# Patient Record
Sex: FEMALE | Race: WHITE | NOT HISPANIC OR LATINO | Employment: OTHER | URBAN - METROPOLITAN AREA
[De-identification: names, ages, dates, MRNs, and addresses within clinical notes are randomized per-mention and may not be internally consistent; named-entity substitution may affect disease eponyms.]

---

## 2017-02-01 ENCOUNTER — APPOINTMENT (OUTPATIENT)
Dept: LAB | Facility: CLINIC | Age: 82
End: 2017-02-01
Payer: MEDICARE

## 2017-02-01 ENCOUNTER — TRANSCRIBE ORDERS (OUTPATIENT)
Dept: LAB | Facility: CLINIC | Age: 82
End: 2017-02-01

## 2017-02-01 DIAGNOSIS — I10 ESSENTIAL HYPERTENSION, BENIGN: Primary | ICD-10-CM

## 2017-02-01 DIAGNOSIS — E78.5 HYPERLIPIDEMIA, UNSPECIFIED HYPERLIPIDEMIA TYPE: ICD-10-CM

## 2017-02-01 LAB
ALBUMIN SERPL BCP-MCNC: 4 G/DL (ref 3.5–5)
ALP SERPL-CCNC: 112 U/L (ref 46–116)
ALT SERPL W P-5'-P-CCNC: 28 U/L (ref 12–78)
ANION GAP SERPL CALCULATED.3IONS-SCNC: 6 MMOL/L (ref 4–13)
AST SERPL W P-5'-P-CCNC: 20 U/L (ref 5–45)
BASOPHILS # BLD AUTO: 0.03 THOUSANDS/ΜL (ref 0–0.1)
BASOPHILS NFR BLD AUTO: 1 % (ref 0–1)
BILIRUB SERPL-MCNC: 0.94 MG/DL (ref 0.2–1)
BUN SERPL-MCNC: 20 MG/DL (ref 5–25)
CALCIUM SERPL-MCNC: 9.8 MG/DL (ref 8.3–10.1)
CHLORIDE SERPL-SCNC: 105 MMOL/L (ref 100–108)
CHOLEST SERPL-MCNC: 191 MG/DL (ref 50–200)
CO2 SERPL-SCNC: 29 MMOL/L (ref 21–32)
CREAT SERPL-MCNC: 1.01 MG/DL (ref 0.6–1.3)
EOSINOPHIL # BLD AUTO: 0.1 THOUSAND/ΜL (ref 0–0.61)
EOSINOPHIL NFR BLD AUTO: 2 % (ref 0–6)
ERYTHROCYTE [DISTWIDTH] IN BLOOD BY AUTOMATED COUNT: 13.2 % (ref 11.6–15.1)
GFR SERPL CREATININE-BSD FRML MDRD: 52.6 ML/MIN/1.73SQ M
GLUCOSE SERPL-MCNC: 85 MG/DL (ref 65–140)
HCT VFR BLD AUTO: 44.2 % (ref 34.8–46.1)
HDLC SERPL-MCNC: 97 MG/DL (ref 40–60)
HGB BLD-MCNC: 15 G/DL (ref 11.5–15.4)
LDLC SERPL CALC-MCNC: 78 MG/DL (ref 0–100)
LYMPHOCYTES # BLD AUTO: 2.31 THOUSANDS/ΜL (ref 0.6–4.47)
LYMPHOCYTES NFR BLD AUTO: 41 % (ref 14–44)
MCH RBC QN AUTO: 32.5 PG (ref 26.8–34.3)
MCHC RBC AUTO-ENTMCNC: 33.9 G/DL (ref 31.4–37.4)
MCV RBC AUTO: 96 FL (ref 82–98)
MONOCYTES # BLD AUTO: 0.55 THOUSAND/ΜL (ref 0.17–1.22)
MONOCYTES NFR BLD AUTO: 10 % (ref 4–12)
NEUTROPHILS # BLD AUTO: 2.63 THOUSANDS/ΜL (ref 1.85–7.62)
NEUTS SEG NFR BLD AUTO: 46 % (ref 43–75)
NRBC BLD AUTO-RTO: 0 /100 WBCS
PLATELET # BLD AUTO: 186 THOUSANDS/UL (ref 149–390)
PMV BLD AUTO: 11.1 FL (ref 8.9–12.7)
POTASSIUM SERPL-SCNC: 4.1 MMOL/L (ref 3.5–5.3)
PROT SERPL-MCNC: 7.1 G/DL (ref 6.4–8.2)
RBC # BLD AUTO: 4.62 MILLION/UL (ref 3.81–5.12)
SODIUM SERPL-SCNC: 140 MMOL/L (ref 136–145)
TRIGL SERPL-MCNC: 81 MG/DL
WBC # BLD AUTO: 5.63 THOUSAND/UL (ref 4.31–10.16)

## 2017-02-01 PROCEDURE — 36415 COLL VENOUS BLD VENIPUNCTURE: CPT

## 2017-02-01 PROCEDURE — 85025 COMPLETE CBC W/AUTO DIFF WBC: CPT

## 2017-02-01 PROCEDURE — 80053 COMPREHEN METABOLIC PANEL: CPT

## 2017-02-01 PROCEDURE — 80061 LIPID PANEL: CPT

## 2017-04-19 ENCOUNTER — ALLSCRIPTS OFFICE VISIT (OUTPATIENT)
Dept: OTHER | Facility: OTHER | Age: 82
End: 2017-04-19

## 2017-07-12 ENCOUNTER — ALLSCRIPTS OFFICE VISIT (OUTPATIENT)
Dept: OTHER | Facility: OTHER | Age: 82
End: 2017-07-12

## 2017-10-11 ENCOUNTER — ALLSCRIPTS OFFICE VISIT (OUTPATIENT)
Dept: OTHER | Facility: OTHER | Age: 82
End: 2017-10-11

## 2017-10-12 NOTE — PROGRESS NOTES
Assessment  1  Atherosclerotic peripheral vascular disease (440 20) (I70 209)   2  Onychomycosis (110 1) (B35 1)   3  Pain in both feet (729 5) (M79 671,M79 672)    Plan   · Follow-up visit in 9 weeks Evaluation and Treatment  Follow-up  Status: Hold For -  Scheduling  Requested for: [id number]   · Inspect your feet and legs daily if you have vascular disease ; Status:Complete;   Done:  [id number] 09:33AM    Discussion/Summary    Continue stretching and icing to prevent Achilles tendinitis recurrence  Daily foot checks monitor for signs of infection  The patient was counseled regarding instructions for management,-patient and family education,-importance of compliance with treatment  Chief Complaint  patient has chief complaint of thick painful toenails that hurt when walking and wearing shoes  History of Present Illness  HPI: Pain in Achilles has mostly resolved only pain when walking long distances  Active Problems  1  Achilles tendinitis of right lower extremity (726 71) (M76 61)   2  Atherosclerotic peripheral vascular disease (440 20) (I70 209)   3  Onychomycosis (110 1) (B35 1)   4  Pain in both feet (729 5) (M79 671,M79 672)    Current Meds   1  Albuterol Sulfate  MCG/ACT AERS; Therapy: (Recorded:19Apr2017) to Recorded   2  Atorvastatin Calcium TABS; Therapy: (Recorded:19Apr2017) to Recorded   3  Daily Multivitamin TABS; Therapy: (Recorded:19Apr2017) to Recorded   4  Klor-Con M20 20 MEQ Oral Tablet Extended Release; Therapy: (Recorded:19Apr2017) to Recorded   5  Triamterene-HCTZ 37 5-25 MG Oral Capsule; Therapy: (Recorded:[id number]) to Recorded    The medication list was reviewed and updated today  Allergies  1   No Known Drug Allergies    Vitals   Recorded: [id number] 99:66RA   Systolic 029   Diastolic 85   Height 5 ft 3 5 in   Weight 200 lb    BMI Calculated 34 87   BSA Calculated 1 94     Physical Exam  Left Foot: Appearance: Normal except as noted: excessive pronation-and-pes planus  Second toe deformities include hammer toe  Third toe deformities include hammer toe  Forth toe deformities include hammer toe  Fifth toe deformities include hammer toe  Evaluation of the great toe nail demonstrates an ingrown nail  Mild ingrown tibial border  All nails thick discolored dystrophic, positive subungual debris, positive pain on palpation  Special Tests: Skin is hairless and atrophic vascular changes noted bilateral Q9 findings bilateral    Right Foot: Appearance: Normal except as noted: excessive pronation-and-pes planus  Second toe deformities include hammer toe  Third toe deformities include hammer toe  Forth toe deformities include hammer toe  Fifth toe deformities include hammer toe  Negative ulceration negative maceration negative signs of infection  Special Tests: mild edema positive nonpitting edema bilateral feet and ankles moderate venous stasis  Neurological Exam: performed  Light touch was intact bilaterally  Vibratory sensation was decreased in both first metatarsophalangeal joints  Response to monofilament test was intact bilaterally  Vascular Exam: performed Dorsalis pedis pulses were 1/4 bilaterally  Posterior tibial pulses were absent bilaterally  Capillary refill time was greater than 3 seconds bilaterally  Toenails: All of the toenails were elongated,-hypertrophied,-discolored,-shown to have subungual debris-and-tender  Both first toenails were ingrown  Hyperkeratosis: present on both first sub metatarsals  Procedure  Aseptic debridement and planing of nails ×10, with nail nipper and nail , no complications      Signatures   Electronically signed by :  Augie Castillo DPM; Oct 11 2017  9:34AM EST                       (Author)

## 2017-12-13 ENCOUNTER — ALLSCRIPTS OFFICE VISIT (OUTPATIENT)
Dept: OTHER | Facility: OTHER | Age: 82
End: 2017-12-13

## 2017-12-20 NOTE — PROGRESS NOTES
Assessment  1  Onychomycosis (110 1) (B35 1)   2  Atherosclerotic peripheral vascular disease (440 20) (I70 209)   3  Pain in both feet (729 5) (M79 671,M79 672)    Plan   · Inspect your feet and legs daily if you have vascular disease ; Status:Complete;   Done:34Ioh8640   · Follow-up visit in 9 weeks Evaluation and Treatment  Follow-up  Status: Complete  Done:21Iuh1473    Discussion/Summary    Daily foot checks monitor for signs of infection  The patient was counseled regarding instructions for management,-- patient and family education,-- importance of compliance with treatment  Chief Complaint  patient has chief complaint of thick painful toenails that hurt when walking and wearing shoes  History of Present Illness  HPI: Pain in Achilles has mostly resolved only pain when walking long distances  Active Problems  1  Achilles tendinitis of right lower extremity (726 71) (M76 61)   2  Atherosclerotic peripheral vascular disease (440 20) (I70 209)   3  Onychomycosis (110 1) (B35 1)   4  Pain in both feet (729 5) (M79 671,M79 672)    Current Meds   1  Albuterol Sulfate  MCG/ACT AERS; Therapy: (Recorded:19Apr2017) to Recorded   2  Atorvastatin Calcium TABS; Therapy: (Recorded:19Apr2017) to Recorded   3  Daily Multivitamin TABS; Therapy: (Recorded:19Apr2017) to Recorded   4  Klor-Con M20 20 MEQ Oral Tablet Extended Release; Therapy: (Recorded:19Apr2017) to Recorded   5  Triamterene-HCTZ 37 5-25 MG Oral Capsule; Therapy: (Recorded:19Apr2017) to Recorded    The medication list was reviewed and updated today  Allergies  1  No Known Drug Allergies    Vitals   Recorded: [id number] 09:29AM   Height 5 ft 3 5 in   Weight 200 lb    BMI Calculated 34 87   BSA Calculated 1 94     Physical Exam   Constitutional: no acute distress, well appearing and well nourished  Cardiovascular: abnormal dorsalis pedis pulse,-- abnormal posterior tibialis pulse,-- dependence rubor-- and-- abnormal capillary refill  Orthopedic/Biomechanical: abnormal foot type,-- hammertoe(s),-- discolored nails-- and-- nail tenderness  Skin: keratoses  Psychiatric: oriented to person, place, and time  Left Foot: Appearance: Normal except as noted: excessive pronation-- and-- pes planus  Second toe deformities include hammer toe  Third toe deformities include hammer toe  Forth toe deformities include hammer toe  Fifth toe deformities include hammer toe  Evaluation of the great toe nail demonstrates no ingrown nail  Resolving ingrown no signs of infection  All nails thick discolored dystrophic, positive subungual debris, positive pain on palpation  Special Tests: Skin is hairless and atrophic vascular changes noted bilateral Q9 findings bilateral    Right Foot: Appearance: Normal except as noted: excessive pronation-- and-- pes planus  Second toe deformities include hammer toe  Third toe deformities include hammer toe  Forth toe deformities include hammer toe  Fifth toe deformities include hammer toe  Negative ulceration negative maceration negative signs of infection  Special Tests: moderate xerosis plantar feet bilateral  No signs of infection  Fissure posterior heel right  Granular superficial    Neurological Exam: performed  Light touch was intact bilaterally  Vibratory sensation was decreased in both first metatarsophalangeal joints  Response to monofilament test was intact bilaterally  Vascular Exam: performed Dorsalis pedis pulses were 1/4 bilaterally  Posterior tibial pulses were absent bilaterally  Capillary refill time was greater than 3 seconds bilaterally  Toenails: All of the toenails were elongated,-- hypertrophied,-- discolored,-- shown to have subungual debris-- and-- tender  Both first toenails were ingrown  Hyperkeratosis: present on both first sub metatarsals        Procedure  Aseptic debridement and planing of nails Ã10, with nail nipper and nail , no complications      Future Appointments    Date/Time Provider Specialty Site   03/14/2018 09:30 AM [facility] [address] [facility]     Signatures   Electronically signed by :  Nick Escobar DPM; Dec 19 2017 10:43AM EST                       (Author)

## 2018-01-12 VITALS
DIASTOLIC BLOOD PRESSURE: 85 MMHG | BODY MASS INDEX: 34.15 KG/M2 | WEIGHT: 200 LBS | SYSTOLIC BLOOD PRESSURE: 140 MMHG | HEIGHT: 64 IN

## 2018-01-13 VITALS — WEIGHT: 200 LBS | HEIGHT: 64 IN | BODY MASS INDEX: 34.15 KG/M2

## 2018-01-14 VITALS
BODY MASS INDEX: 34.15 KG/M2 | SYSTOLIC BLOOD PRESSURE: 141 MMHG | DIASTOLIC BLOOD PRESSURE: 86 MMHG | WEIGHT: 200 LBS | HEIGHT: 64 IN

## 2018-01-22 VITALS — HEIGHT: 64 IN | BODY MASS INDEX: 34.15 KG/M2 | WEIGHT: 200 LBS

## 2018-03-14 ENCOUNTER — OFFICE VISIT (OUTPATIENT)
Dept: PODIATRY | Facility: CLINIC | Age: 83
End: 2018-03-14
Payer: MEDICARE

## 2018-03-14 DIAGNOSIS — M79.672 PAIN IN BOTH FEET: ICD-10-CM

## 2018-03-14 DIAGNOSIS — I70.209 ATHEROSCLEROTIC PERIPHERAL VASCULAR DISEASE (HCC): Primary | ICD-10-CM

## 2018-03-14 DIAGNOSIS — M79.671 PAIN IN BOTH FEET: ICD-10-CM

## 2018-03-14 DIAGNOSIS — B35.1 ONYCHOMYCOSIS: ICD-10-CM

## 2018-03-14 PROCEDURE — 11721 DEBRIDE NAIL 6 OR MORE: CPT | Performed by: PODIATRIST

## 2018-03-14 RX ORDER — ATORVASTATIN CALCIUM 80 MG/1
TABLET, FILM COATED ORAL
COMMUNITY
End: 2019-02-11

## 2018-03-14 RX ORDER — TRIAMTERENE AND HYDROCHLOROTHIAZIDE 37.5; 25 MG/1; MG/1
TABLET ORAL
COMMUNITY
Start: 2018-02-26

## 2018-03-14 RX ORDER — BUDESONIDE AND FORMOTEROL FUMARATE DIHYDRATE 80; 4.5 UG/1; UG/1
AEROSOL RESPIRATORY (INHALATION)
COMMUNITY
Start: 2017-02-01 | End: 2019-02-11

## 2018-03-14 RX ORDER — ALBUTEROL SULFATE 90 UG/1
2 AEROSOL, METERED RESPIRATORY (INHALATION)
COMMUNITY
Start: 2017-04-07 | End: 2018-04-07

## 2018-03-14 RX ORDER — POTASSIUM CHLORIDE 1500 MG/1
TABLET, EXTENDED RELEASE ORAL
COMMUNITY
Start: 2017-12-15

## 2018-03-14 RX ORDER — ATORVASTATIN CALCIUM 10 MG/1
TABLET, FILM COATED ORAL
COMMUNITY
Start: 2017-11-20 | End: 2019-02-11

## 2018-03-14 RX ORDER — ATORVASTATIN CALCIUM 10 MG/1
TABLET, FILM COATED ORAL
COMMUNITY
Start: 2018-02-18

## 2018-03-14 NOTE — PROGRESS NOTES
Assessment/Plan:    Aseptic debridement and planning of nails x10 and manually and mechanically  Daily foot checks monitor for signs of infection follow-up 3 months     Diagnoses and all orders for this visit:    Atherosclerotic peripheral vascular disease ([address] )    Onychomycosis    Pain in both feet    Other orders  -     albuterol (PROVENTIL HFA,VENTOLIN HFA) 90 mcg/act inhaler; Inhale 2 puffs  -     atorvastatin (LIPITOR) 10 mg tablet; TAKE 1 TABLET DAILY  -     atorvastatin (LIPITOR) 10 mg tablet;   -     atorvastatin (LIPITOR) 80 mg tablet; Take by mouth  -     budesonide-formoterol (SYMBICORT) 80-4 5 MCG/ACT inhaler; SYMBICORT 80-4 5 MCG/ACT AERO  -     Multiple Vitamin (MULTI-VITAMIN DAILY PO); Take by mouth  -     KLOR-CON M20 20 MEQ tablet;   -     triamterene-hydrochlorothiazide (MAXZIDE-25) 37 5-25 mg per tablet;           Subjective:      Patient ID: Litzy Palmer is a 80 y o  female  Patient has chief complaint of thick painful nails that hurt when walking wearing shoes patient has not noticed any redness or signs of infection  The following portions of the patient's history were reviewed and updated as appropriate: allergies, current medications, past family history, past medical history, past social history, past surgical history and problem list     Review of Systems   Constitutional: Negative  HENT: Negative  Eyes: Negative  Respiratory: Negative  Cardiovascular: Negative  Gastrointestinal: Negative  Endocrine: Negative  Genitourinary: Negative  Musculoskeletal: Negative  Skin: Negative  Allergic/Immunologic: Negative  Neurological: Negative  Hematological: Negative  Psychiatric/Behavioral: Negative  Objective: There were no vitals taken for this visit  Current Meds   1  Albuterol Sulfate  MCG/ACT AERS; Therapy: (Recorded:19Apr2017) to Recorded   2  Atorvastatin Calcium TABS;  Therapy: (Recorded:19Apr2017) to Recorded 3  Daily Multivitamin TABS; Therapy: (Recorded:19Apr2017) to Recorded   4  Klor-Con M20 20 MEQ Oral Tablet Extended Release; Therapy: (Recorded:19Apr2017) to Recorded   5  Triamterene-HCTZ 37 5-25 MG Oral Capsule; Therapy: (Recorded:19Apr2017) to Recorded            Allergies  1  No Known Drug Allergies     Physical Exam     Constitutional: no acute distress, well appearing and well nourished  Cardiovascular: abnormal dorsalis pedis pulse,-- abnormal posterior tibialis pulse,-- dependence rubor-- and-- abnormal capillary refill  Orthopedic/Biomechanical: abnormal foot type,-- hammertoe(s),-- discolored nails-- and-- nail tenderness  Skin: keratoses  Psychiatric: oriented to person, place, and time  Left Foot: Appearance: Normal except as noted: excessive pronation-- and-- pes planus  Second toe deformities include hammer toe  Third toe deformities include hammer toe  Forth toe deformities include hammer toe  Fifth toe deformities include hammer toe  Evaluation of the great toe nail demonstrates no ingrown nail  Resolving ingrown no signs of infection  All nails thick discolored dystrophic, positive subungual debris, positive pain on palpation  Special Tests: Skin is hairless and atrophic vascular changes noted bilateral Q9 findings bilateral    Right Foot: Appearance: Normal except as noted: excessive pronation-- and-- pes planus  Second toe deformities include hammer toe  Third toe deformities include hammer toe  Forth toe deformities include hammer toe  Fifth toe deformities include hammer toe  Negative ulceration negative maceration negative signs of infection  Special Tests: moderate xerosis plantar feet bilateral  No signs of infection  Fissure posterior heel right  Granular superficial    Neurological Exam: performed  Light touch was intact bilaterally  Vibratory sensation was decreased in both first metatarsophalangeal joints  Response to monofilament test was intact bilaterally     Vascular Exam: performed Dorsalis pedis pulses were 1/4 bilaterally  Posterior tibial pulses were absent bilaterally  Capillary refill time was greater than 3 seconds bilaterally  Toenails: All of the toenails were elongated,-- hypertrophied,-- discolored,-- shown to have subungual debris-- and-- tender  Both first toenails were ingrown  Hyperkeratosis: present on both first sub metatarsals  Plus one edema bilateral moderate venous stasis negative calf tenderness no signs of infection  Skin is hairless and atrophic vascular changes noted bilateral  Decreased vibratory sensation bilateral monofilament sensation 2/10 absent

## 2018-06-13 ENCOUNTER — OFFICE VISIT (OUTPATIENT)
Dept: PODIATRY | Facility: CLINIC | Age: 83
End: 2018-06-13
Payer: MEDICARE

## 2018-06-13 VITALS — BODY MASS INDEX: 36.65 KG/M2 | HEIGHT: 65 IN | WEIGHT: 220 LBS

## 2018-06-13 DIAGNOSIS — I87.2 VENOUS (PERIPHERAL) INSUFFICIENCY: ICD-10-CM

## 2018-06-13 DIAGNOSIS — I70.209 ATHEROSCLEROTIC PERIPHERAL VASCULAR DISEASE (HCC): Primary | ICD-10-CM

## 2018-06-13 DIAGNOSIS — M79.671 PAIN IN BOTH FEET: ICD-10-CM

## 2018-06-13 DIAGNOSIS — M79.89 LEG SWELLING: ICD-10-CM

## 2018-06-13 DIAGNOSIS — B35.1 ONYCHOMYCOSIS: ICD-10-CM

## 2018-06-13 DIAGNOSIS — M79.672 PAIN IN BOTH FEET: ICD-10-CM

## 2018-06-13 PROCEDURE — 99212 OFFICE O/P EST SF 10 MIN: CPT | Performed by: PODIATRIST

## 2018-06-13 PROCEDURE — 11721 DEBRIDE NAIL 6 OR MORE: CPT | Performed by: PODIATRIST

## 2018-06-13 NOTE — PROGRESS NOTES
Assessment/Plan:    Aseptic debridement and planning of nails x10 and manually and mechanically discussed elevation, discussed pressure stockings  Discussed risks and signs of DVT  Suggested vascular evaluation  Stressed elavation      Daily foot checks monitor for signs of infection follow-up 3 months     Diagnoses and all orders for this visit:    Atherosclerotic peripheral vascular disease ([address] )    Onychomycosis    Pain in both feet    Venous (peripheral) insufficiency    Leg swelling          Subjective:      Patient ID: Susan Lacey is a 80 y o  female  Patient has chief complaint of thick painful nails that hurt when walking wearing shoes patient has not noticed any redness or signs of infection  Patient has moderate swelling bilateral legs  Has some discoloration of bilateral legs  Denies any calf tenderness or shortness of breath  No cramping in legs when walking  Patient denies any falls or instability  The following portions of the patient's history were reviewed and updated as appropriate: allergies, current medications, past family history, past medical history, past social history, past surgical history and problem list     Review of Systems   Constitutional: Negative  HENT: Negative  Eyes: Negative  Respiratory: Negative  Cardiovascular: Negative  Gastrointestinal: Negative  Endocrine: Negative  Genitourinary: Negative  Musculoskeletal: Negative  Skin: Negative  Allergic/Immunologic: Negative  Neurological: Negative  Hematological: Negative  Psychiatric/Behavioral: Negative  Objective: There were no vitals taken for this visit  Current Meds   1  Albuterol Sulfate  MCG/ACT AERS; Therapy: (Recorded:19Apr2017) to Recorded   2  Atorvastatin Calcium TABS; Therapy: (Recorded:19Apr2017) to Recorded   3  Daily Multivitamin TABS; Therapy: (Recorded:19Apr2017) to Recorded   4   Klor-Con M20 20 MEQ Oral Tablet Extended Release; Therapy: (Recorded:19Apr2017) to Recorded   5  Triamterene-HCTZ 37 5-25 MG Oral Capsule; Therapy: (Recorded:19Apr2017) to Recorded            Allergies  1  No Known Drug Allergies     Physical Exam     Constitutional: no acute distress, well appearing and well nourished  Cardiovascular: abnormal dorsalis pedis pulse,-- abnormal posterior tibialis pulse,-- dependence rubor-- and-- abnormal capillary refill  Orthopedic/Biomechanical: abnormal foot type,-- hammertoe(s),-- discolored nails-- and-- nail tenderness  Skin: keratoses  Psychiatric: oriented to person, place, and time  Left Foot: Appearance: Normal except as noted: excessive pronation-- and-- pes planus  Second toe deformities include hammer toe  Third toe deformities include hammer toe  Forth toe deformities include hammer toe  Fifth toe deformities include hammer toe  Evaluation of the great toe nail demonstrates no ingrown nail  Resolving ingrown no signs of infection  All nails thick discolored dystrophic, positive subungual debris, positive pain on palpation  Special Tests: Skin is hairless and atrophic vascular changes noted bilateral Q9 findings bilateral    Right Foot: Appearance: Normal except as noted: excessive pronation-- and-- pes planus  Second toe deformities include hammer toe  Third toe deformities include hammer toe  Forth toe deformities include hammer toe  Fifth toe deformities include hammer toe  Negative ulceration negative maceration negative signs of infection  Special Tests: moderate xerosis plantar feet bilateral  No signs of infection  Fissure posterior heel right  Granular superficial    Neurological Exam: performed  Light touch was intact bilaterally  Vibratory sensation was decreased in both first metatarsophalangeal joints  Response to monofilament test was intact bilaterally  Vascular Exam: performed Dorsalis pedis pulses were 1/4 bilaterally  Posterior tibial pulses were absent bilaterally   Capillary refill time was greater than 3 seconds bilaterally  Toenails: All of the toenails were elongated,-- hypertrophied,-- discolored,-- shown to have subungual debris-- and-- tender  Both first toenails were ingrown  Hyperkeratosis: present on both first sub metatarsals  Plus one edema bilateral moderate venous stasis negative calf tenderness no signs of infection  Skin is hairless and atrophic vascular changes noted bilateral  Decreased vibratory sensation bilateral monofilament sensation 2/10 absent  Plus one edema bilateral feet and ankles moderate venous stasis mild hyperpigmentation  Negative calf tenderness negative Homans sign    Patient has rectus gait and no instability on gait exam

## 2018-09-12 ENCOUNTER — OFFICE VISIT (OUTPATIENT)
Dept: PODIATRY | Facility: CLINIC | Age: 83
End: 2018-09-12
Payer: MEDICARE

## 2018-09-12 VITALS — HEIGHT: 65 IN | WEIGHT: 220 LBS | BODY MASS INDEX: 36.65 KG/M2

## 2018-09-12 DIAGNOSIS — I70.209 ATHEROSCLEROTIC PERIPHERAL VASCULAR DISEASE (HCC): Primary | ICD-10-CM

## 2018-09-12 DIAGNOSIS — M79.671 PAIN IN BOTH FEET: ICD-10-CM

## 2018-09-12 DIAGNOSIS — B35.1 ONYCHOMYCOSIS: ICD-10-CM

## 2018-09-12 DIAGNOSIS — M79.672 PAIN IN BOTH FEET: ICD-10-CM

## 2018-09-12 PROCEDURE — 11721 DEBRIDE NAIL 6 OR MORE: CPT | Performed by: PODIATRIST

## 2018-09-12 NOTE — PROGRESS NOTES
Assessment/Plan:    Aseptic debridement and planning of nails x10 and manually and mechanically     Daily foot checks monitor for signs of infection follow-up 3 months     Diagnoses and all orders for this visit:    Atherosclerotic peripheral vascular disease ([address] )    Onychomycosis    Pain in both feet          Subjective:      Patient ID: Merari Encarnacion is a 80 y o  female  Patient has chief complaint of thick painful nails that hurt when walking wearing shoes patient has not noticed any redness or signs of infection  The following portions of the patient's history were reviewed and updated as appropriate: allergies, current medications, past family history, past medical history, past social history, past surgical history and problem list     Review of Systems   Constitutional: Negative  HENT: Negative  Eyes: Negative  Respiratory: Negative  Cardiovascular: Negative  Gastrointestinal: Negative  Endocrine: Negative  Genitourinary: Negative  Musculoskeletal: Negative  Skin: Negative  Allergic/Immunologic: Negative  Neurological: Negative  Hematological: Negative  Psychiatric/Behavioral: Negative  Objective:      Ht 5' 5" (1 651 m)   Wt 99 8 kg (220 lb)   BMI 36 61 kg/m²     Current Meds   1  Albuterol Sulfate  MCG/ACT AERS; Therapy: (Recorded:19Apr2017) to Recorded   2  Atorvastatin Calcium TABS; Therapy: (Recorded:19Apr2017) to Recorded   3  Daily Multivitamin TABS; Therapy: (Recorded:19Apr2017) to Recorded   4  Klor-Con M20 20 MEQ Oral Tablet Extended Release; Therapy: (Recorded:19Apr2017) to Recorded   5  Triamterene-HCTZ 37 5-25 MG Oral Capsule; Therapy: (Recorded:19Apr2017) to Recorded            Allergies  1  No Known Drug Allergies     Physical Exam     Constitutional: no acute distress, well appearing and well nourished    Cardiovascular: abnormal dorsalis pedis pulse,-- abnormal posterior tibialis pulse,-- dependence rubor-- and-- abnormal capillary refill  Orthopedic/Biomechanical: abnormal foot type,-- hammertoe(s),-- discolored nails-- and-- nail tenderness  Skin: keratoses  Psychiatric: oriented to person, place, and time  Left Foot: Appearance: Normal except as noted: excessive pronation-- and-- pes planus  Second toe deformities include hammer toe  Third toe deformities include hammer toe  Forth toe deformities include hammer toe  Fifth toe deformities include hammer toe  Evaluation of the great toe nail demonstrates no ingrown nail  Resolving ingrown no signs of infection  All nails thick discolored dystrophic, positive subungual debris, positive pain on palpation  Special Tests: Skin is hairless and atrophic vascular changes noted bilateral Q9 findings bilateral    Right Foot: Appearance: Normal except as noted: excessive pronation-- and-- pes planus  Second toe deformities include hammer toe  Third toe deformities include hammer toe  Forth toe deformities include hammer toe  Fifth toe deformities include hammer toe  Neurological Exam: performed  Light touch was intact bilaterally  Vibratory sensation was decreased in both first metatarsophalangeal joints  Response to monofilament test was intact bilaterally  Vascular Exam: performed Dorsalis pedis pulses were 1/4 bilaterally  Posterior tibial pulses were absent bilaterally  Capillary refill time was greater than 3 seconds bilaterally  Toenails: All of the toenails were elongated,-- hypertrophied,-- discolored,-- shown to have subungual debris-- and-- tender  Both first toenails were ingrown  Hyperkeratosis: present on both first sub metatarsals  Plus one edema bilateral moderate venous stasis negative calf tenderness no signs of infection  Skin is hairless and atrophic vascular changes noted bilateral  Decreased vibratory sensation bilateral monofilament sensation 2/10 absent  Plus one edema bilateral feet and ankles moderate venous stasis mild hyperpigmentation  Mild maceration 4th interspace bilateral   Mild tinea pedis    No signs of infection

## 2018-12-05 ENCOUNTER — OFFICE VISIT (OUTPATIENT)
Dept: PODIATRY | Facility: CLINIC | Age: 83
End: 2018-12-05
Payer: MEDICARE

## 2018-12-05 VITALS — BODY MASS INDEX: 36.65 KG/M2 | HEIGHT: 65 IN | WEIGHT: 220 LBS

## 2018-12-05 DIAGNOSIS — M79.672 PAIN IN BOTH FEET: ICD-10-CM

## 2018-12-05 DIAGNOSIS — M79.671 PAIN IN BOTH FEET: ICD-10-CM

## 2018-12-05 DIAGNOSIS — B35.1 ONYCHOMYCOSIS: ICD-10-CM

## 2018-12-05 DIAGNOSIS — I70.209 ATHEROSCLEROTIC PERIPHERAL VASCULAR DISEASE (HCC): Primary | ICD-10-CM

## 2018-12-05 PROCEDURE — 11721 DEBRIDE NAIL 6 OR MORE: CPT | Performed by: PODIATRIST

## 2018-12-05 NOTE — PROGRESS NOTES
Assessment/Plan:    Aseptic debridement and planning of nails x10 and manually and mechanically     Daily foot checks monitor for signs of infection follow-up 3 months     Diagnoses and all orders for this visit:    Atherosclerotic peripheral vascular disease ([address] )    Onychomycosis    Pain in both feet          Subjective:      Patient ID: Keisha Zhang is a 80 y o  female  Patient has chief complaint of thick painful nails that hurt when walking wearing shoes patient has not noticed any redness or signs of infection  The following portions of the patient's history were reviewed and updated as appropriate: allergies, current medications, past family history, past medical history, past social history, past surgical history and problem list     Review of Systems   Constitutional: Negative  HENT: Negative  Eyes: Negative  Respiratory: Negative  Cardiovascular: Negative  Gastrointestinal: Negative  Endocrine: Negative  Genitourinary: Negative  Musculoskeletal: Negative  Skin: Negative  Allergic/Immunologic: Negative  Neurological: Negative  Hematological: Negative  Psychiatric/Behavioral: Negative  Objective:      Ht 5' 5" (1 651 m)   Wt 99 8 kg (220 lb)   BMI 36 61 kg/m²     Current Meds   1  Albuterol Sulfate  MCG/ACT AERS; Therapy: (Recorded:19Apr2017) to Recorded   2  Atorvastatin Calcium TABS; Therapy: (Recorded:19Apr2017) to Recorded   3  Daily Multivitamin TABS; Therapy: (Recorded:19Apr2017) to Recorded   4  Klor-Con M20 20 MEQ Oral Tablet Extended Release; Therapy: (Recorded:19Apr2017) to Recorded   5  Triamterene-HCTZ 37 5-25 MG Oral Capsule; Therapy: (Recorded:19Apr2017) to Recorded            Allergies  1  No Known Drug Allergies     Physical Exam     Constitutional: no acute distress, well appearing and well nourished    Cardiovascular: abnormal dorsalis pedis pulse,-- abnormal posterior tibialis pulse,-- dependence rubor-- and-- abnormal capillary refill  Orthopedic/Biomechanical: abnormal foot type,-- hammertoe(s),-- discolored nails-- and-- nail tenderness  Skin: keratoses  Psychiatric: oriented to person, place, and time  Left Foot: Appearance: Normal except as noted: excessive pronation-- and-- pes planus  Second toe deformities include hammer toe  Third toe deformities include hammer toe  Forth toe deformities include hammer toe  Fifth toe deformities include hammer toe  Evaluation of the great toe nail demonstrates no ingrown nail  Resolving ingrown no signs of infection  All nails thick discolored dystrophic, positive subungual debris, positive pain on palpation  Special Tests: Skin is hairless and atrophic vascular changes noted bilateral Q9 findings bilateral    Right Foot: Appearance: Normal except as noted: excessive pronation-- and-- pes planus  Second toe deformities include hammer toe  Third toe deformities include hammer toe  Forth toe deformities include hammer toe  Fifth toe deformities include hammer toe  Neurological Exam: performed  Light touch was intact bilaterally  Vibratory sensation was decreased in both first metatarsophalangeal joints  Response to monofilament test was intact bilaterally  Vascular Exam: performed Dorsalis pedis pulses were 1/4 bilaterally  Posterior tibial pulses were absent bilaterally  Capillary refill time was greater than 3 seconds bilaterally  Toenails: All of the toenails were elongated,-- hypertrophied,-- discolored,-- shown to have subungual debris-- and-- tender  Both first toenails were ingrown  Hyperkeratosis: present on both first sub metatarsals  Plus one edema bilateral moderate venous stasis negative calf tenderness no signs of infection  Skin is hairless and atrophic vascular changes noted bilateral  Decreased vibratory sensation bilateral monofilament sensation 2/10 absent  Plus one edema bilateral feet and ankles moderate venous stasis mild hyperpigmentation  The negative maceration, no signs of infection    All nails thick discolored dystrophic positive subungual debris positive pain on palpation

## 2019-02-11 ENCOUNTER — OFFICE VISIT (OUTPATIENT)
Dept: URGENT CARE | Facility: CLINIC | Age: 84
End: 2019-02-11
Payer: MEDICARE

## 2019-02-11 VITALS
DIASTOLIC BLOOD PRESSURE: 78 MMHG | BODY MASS INDEX: 35.82 KG/M2 | WEIGHT: 215 LBS | TEMPERATURE: 98.5 F | HEART RATE: 79 BPM | RESPIRATION RATE: 18 BRPM | HEIGHT: 65 IN | OXYGEN SATURATION: 96 % | SYSTOLIC BLOOD PRESSURE: 137 MMHG

## 2019-02-11 DIAGNOSIS — J04.0 ACUTE LARYNGITIS: Primary | ICD-10-CM

## 2019-02-11 DIAGNOSIS — R09.82 POSTNASAL DRIP: ICD-10-CM

## 2019-02-11 PROCEDURE — 99213 OFFICE O/P EST LOW 20 MIN: CPT | Performed by: FAMILY MEDICINE

## 2019-02-11 RX ORDER — FLUTICASONE PROPIONATE 50 MCG
1 SPRAY, SUSPENSION (ML) NASAL DAILY
Qty: 16 G | Refills: 0 | Status: SHIPPED | OUTPATIENT
Start: 2019-02-11 | End: 2019-07-24 | Stop reason: ALTCHOICE

## 2019-02-11 NOTE — PROGRESS NOTES
[facility]        NAME: Leandra Schwab is a 80 y o  female  : 1935    MRN: [id number]  DATE: 2019  TIME: 11:57 AM    Assessment and Plan   Acute laryngitis [J04 0]  1  Acute laryngitis     2  Postnasal drip  fluticasone (FLONASE) 50 mcg/act nasal spray     Acute laryngitis likely secondary to postnasal drip  As such patient prescribed Flonase to counteract postnasal drip  Patient also encouraged to gargle with warm salt water twice daily and to avoid talking to improve her laryngitis  No history of alcohol or tobacco abuse concerning for malignancy  Patient Instructions     Follow up with PCP in 3-5 days  Proceed to  ER if symptoms worsen  Chief Complaint     Chief Complaint   Patient presents with    Cold Like Symptoms     pt has been sneezing and coughing for 2 weeks, woke up yesterday with hoarse voice  no pain          History of Present Illness       51-year-old female who presents today due to hoarse voice which started yesterday morning  This has been associated with a cough for the same duration and sneezing for almost 2 weeks  Denies any nasal congestion and has minimal rhinorrhea  Review of Systems   Review of Systems   Constitutional: Negative for chills and fever  HENT: Positive for rhinorrhea (occasionally), sneezing and voice change  Negative for congestion  Respiratory: Positive for cough            Current Medications       Current Outpatient Medications:     atorvastatin (LIPITOR) 10 mg tablet, , Disp: , Rfl:     KLOR-CON M20 20 MEQ tablet, , Disp: , Rfl:     Multiple Vitamin (MULTI-VITAMIN DAILY PO), Take by mouth, Disp: , Rfl:     triamterene-hydrochlorothiazide (MAXZIDE-25) 37 5-25 mg per tablet, , Disp: , Rfl:     fluticasone (FLONASE) 50 mcg/act nasal spray, 1 spray into each nostril daily, Disp: 16 g, Rfl: 0    Current Allergies     Allergies as of 2019    (No Known Allergies)            The following portions of the patient's history were reviewed and updated as appropriate: allergies, current medications, past family history, past medical history, past social history, past surgical history and problem list      Past Medical History:   Diagnosis Date    Hyperlipidemia     Hypertension        Past Surgical History:   Procedure Laterality Date    JOINT REPLACEMENT      bilateral knee, right hip       History reviewed  No pertinent family history  Medications have been verified  Objective   /78   Pulse 79   Temp 98 5 °F (36 9 °C)   Resp 18   Ht 5' 5" (1 651 m)   Wt 97 5 kg (215 lb)   SpO2 96%   BMI 35 78 kg/m²        Physical Exam     Physical Exam   Constitutional: She is oriented to person, place, and time  She appears well-developed and well-nourished  She appears distressed (Hoarseness)  HENT:   Head: Normocephalic and atraumatic  Mouth/Throat: No oropharyngeal exudate  Cobblestoning of the posterior oropharynx  Mildly inflamed nasal cavity   Eyes: Pupils are equal, round, and reactive to light  Conjunctivae are normal  Right eye exhibits no discharge  Left eye exhibits no discharge  Neck: No thyromegaly present  Pulmonary/Chest: Effort normal    Lymphadenopathy:     She has no cervical adenopathy  Neurological: She is alert and oriented to person, place, and time  Skin: Skin is warm  She is not diaphoretic  Psychiatric: She has a normal mood and affect  Her behavior is normal  Judgment and thought content normal    Vitals reviewed

## 2019-03-06 ENCOUNTER — OFFICE VISIT (OUTPATIENT)
Dept: PODIATRY | Facility: CLINIC | Age: 84
End: 2019-03-06
Payer: MEDICARE

## 2019-03-06 DIAGNOSIS — M79.672 PAIN IN BOTH FEET: ICD-10-CM

## 2019-03-06 DIAGNOSIS — I70.209 ATHEROSCLEROTIC PERIPHERAL VASCULAR DISEASE (HCC): Primary | ICD-10-CM

## 2019-03-06 DIAGNOSIS — M79.671 PAIN IN BOTH FEET: ICD-10-CM

## 2019-03-06 DIAGNOSIS — B35.1 ONYCHOMYCOSIS: ICD-10-CM

## 2019-03-06 PROCEDURE — 11721 DEBRIDE NAIL 6 OR MORE: CPT | Performed by: PODIATRIST

## 2019-03-06 NOTE — PROGRESS NOTES
Assessment/Plan:    Aseptic debridement and planning of nails x10 and manually and mechanically  Daily foot checks monitor for signs of infection  Follow-up 3 months     Diagnoses and all orders for this visit:    Atherosclerotic peripheral vascular disease ([facility] )    Onychomycosis    Pain in both feet          Subjective:      Patient ID: Jeri Aleman is a 80 y o  female  Patient has thick painful nails that hurt when walking wearing shoes      Past Medical History:   Diagnosis Date    Hyperlipidemia     Hypertension          Current Outpatient Medications:     atorvastatin (LIPITOR) 10 mg tablet, , Disp: , Rfl:     fluticasone (FLONASE) 50 mcg/act nasal spray, 1 spray into each nostril daily, Disp: 16 g, Rfl: 0    KLOR-CON M20 20 MEQ tablet, , Disp: , Rfl:     Multiple Vitamin (MULTI-VITAMIN DAILY PO), Take by mouth, Disp: , Rfl:     triamterene-hydrochlorothiazide (MAXZIDE-25) 37 5-25 mg per tablet, , Disp: , Rfl:     Past Surgical History:   Procedure Laterality Date    JOINT REPLACEMENT      bilateral knee, right hip       No Known Allergies    Patient Active Problem List   Diagnosis    Atherosclerotic peripheral vascular disease ([facility] )    Onychomycosis    Pain in both feet       Review of Systems   Constitutional: Negative  HENT: Negative  Eyes: Negative  Respiratory: Negative  Cardiovascular: Negative  Gastrointestinal: Negative  Endocrine: Negative  Genitourinary: Negative  Musculoskeletal: Positive for gait problem  Skin: Positive for color change  Allergic/Immunologic: Negative  Hematological: Negative  Psychiatric/Behavioral: Negative  Objective:  Patient's shoes and socks were removed, feet examined  There were no vitals taken for this visit        Foot Exam    General  General Appearance: appears stated age and healthy   Orientation: alert and oriented to person, place, and time   Affect: appropriate       Right Foot/Ankle     Inspection and Palpation  Arch: pes planus  Hammertoes: second toe, fourth toe, fifth toe and third toe  Hallux valgus: yes  Skin Exam: callus; Neurovascular  Dorsalis pedis: 1+  Posterior tibial: absent      Left Foot/Ankle      Inspection and Palpation  Arch: pes planus  Hammertoes: second toe, fifth toe, fourth toe and third toe  Hallux valgus: yes  Skin Exam: callus and skin changes; Neurovascular  Dorsalis pedis: 1+  Posterior tibial: absent          Right Foot/Ankle   Right Foot Inspection  Skin Exam: callus and callus                              Vascular    The right DP pulse is 1+  The right PT pulse is absent  Right Toe  - Comprehensive Exam  Arch: pes planus  Hammertoes: second toe, fourth toe, fifth toe and third toe  Hallux valgus: yes    Left Foot/Ankle  Left Foot Inspection  Skin Exam: callus                                           Vascular    The left DP pulse is 1+  The left PT pulse is absent  Left Toe  - Comprehensive Exam  Arch: pes planus  Hammertoes: second toe, fifth toe, fourth toe and third toe  Hallux valgus: yes         Physical Exam   Cardiovascular:   Pulses:       Dorsalis pedis pulses are 1+ on the right side, and 1+ on the left side  Posterior tibial pulses are Absent on the right side, and Absent on the left side  Feet:   Right Foot:   Skin Integrity: Positive for callus  Left Foot:   Skin Integrity: Positive for callus  Nursing note and vitals reviewed  Plus one edema bilateral feet and ankles moderate venous stasis mild hyperpigmentation  negative maceration, no signs of infection    All nails thick discolored dystrophic positive subungual debris positive pain on palpation

## 2019-05-15 ENCOUNTER — OFFICE VISIT (OUTPATIENT)
Dept: PODIATRY | Facility: CLINIC | Age: 84
End: 2019-05-15
Payer: MEDICARE

## 2019-05-15 DIAGNOSIS — I70.209 ATHEROSCLEROTIC PERIPHERAL VASCULAR DISEASE (HCC): Primary | ICD-10-CM

## 2019-05-15 DIAGNOSIS — M79.671 PAIN IN BOTH FEET: ICD-10-CM

## 2019-05-15 DIAGNOSIS — M79.672 PAIN IN BOTH FEET: ICD-10-CM

## 2019-05-15 DIAGNOSIS — B35.1 ONYCHOMYCOSIS: ICD-10-CM

## 2019-05-15 PROCEDURE — 11721 DEBRIDE NAIL 6 OR MORE: CPT | Performed by: PODIATRIST

## 2019-05-15 PROCEDURE — 1160F RVW MEDS BY RX/DR IN RCRD: CPT | Performed by: PODIATRIST

## 2019-07-24 ENCOUNTER — OFFICE VISIT (OUTPATIENT)
Dept: PODIATRY | Facility: CLINIC | Age: 84
End: 2019-07-24
Payer: MEDICARE

## 2019-07-24 VITALS — HEART RATE: 83 BPM | SYSTOLIC BLOOD PRESSURE: 140 MMHG | DIASTOLIC BLOOD PRESSURE: 77 MMHG | RESPIRATION RATE: 16 BRPM

## 2019-07-24 DIAGNOSIS — B35.1 ONYCHOMYCOSIS: ICD-10-CM

## 2019-07-24 DIAGNOSIS — M79.671 PAIN IN BOTH FEET: ICD-10-CM

## 2019-07-24 DIAGNOSIS — M79.672 PAIN IN BOTH FEET: ICD-10-CM

## 2019-07-24 DIAGNOSIS — I70.209 ATHEROSCLEROTIC PERIPHERAL VASCULAR DISEASE (HCC): Primary | ICD-10-CM

## 2019-07-24 PROCEDURE — 11721 DEBRIDE NAIL 6 OR MORE: CPT | Performed by: PODIATRIST

## 2019-07-24 NOTE — PROGRESS NOTES
Assessment/Plan:    Aseptic debridement and planning of nails x10 and manually and mechanically      Follow-up 3 months     Diagnoses and all orders for this visit:    Atherosclerotic peripheral vascular disease ([facility] )    Onychomycosis    Pain in both feet          Subjective:      Patient ID: Josemanuel Casanova is a 80 y o  female  Patient has thick painful nails that hurt when walking wearing shoes      Past Medical History:   Diagnosis Date    Hyperlipidemia     Hypertension          Current Outpatient Medications:     atorvastatin (LIPITOR) 10 mg tablet, , Disp: , Rfl:     KLOR-CON M20 20 MEQ tablet, , Disp: , Rfl:     Multiple Vitamin (MULTI-VITAMIN DAILY PO), Take by mouth, Disp: , Rfl:     triamterene-hydrochlorothiazide (MAXZIDE-25) 37 5-25 mg per tablet, , Disp: , Rfl:     Past Surgical History:   Procedure Laterality Date    JOINT REPLACEMENT      bilateral knee, right hip       No Known Allergies    Patient Active Problem List   Diagnosis    Atherosclerotic peripheral vascular disease ([facility] )    Onychomycosis    Pain in both feet       Review of Systems   Constitutional: Negative  HENT: Negative  Eyes: Negative  Respiratory: Negative  Cardiovascular: Negative  Gastrointestinal: Negative  Endocrine: Negative  Genitourinary: Negative  Musculoskeletal: Positive for gait problem  Skin: Positive for color change  Allergic/Immunologic: Negative  Hematological: Negative  Psychiatric/Behavioral: Negative  Objective:  Patient's shoes and socks were removed, feet examined       /77   Pulse 83   Resp 16       Foot Exam    General  General Appearance: appears stated age and healthy   Orientation: alert and oriented to person, place, and time   Affect: appropriate       Right Foot/Ankle     Inspection and Palpation  Arch: pes planus  Hammertoes: second toe, fourth toe, fifth toe and third toe  Hallux valgus: yes  Skin Exam: callus; Neurovascular  Dorsalis pedis: 1+  Posterior tibial: absent      Left Foot/Ankle      Inspection and Palpation  Arch: pes planus  Hammertoes: second toe, fifth toe, fourth toe and third toe  Hallux valgus: yes  Skin Exam: callus and skin changes; Neurovascular  Dorsalis pedis: 1+  Posterior tibial: absent          Right Foot/Ankle   Right Foot Inspection  Skin Exam: callus and callus                              Vascular    The right DP pulse is 1+  The right PT pulse is absent  Right Toe  - Comprehensive Exam  Arch: pes planus  Hammertoes: second toe, fourth toe, fifth toe and third toe  Hallux valgus: yes    Left Foot/Ankle  Left Foot Inspection  Skin Exam: callus                                           Vascular    The left DP pulse is 1+  The left PT pulse is absent  Left Toe  - Comprehensive Exam  Arch: pes planus  Hammertoes: second toe, fifth toe, fourth toe and third toe  Hallux valgus: yes         Physical Exam   Cardiovascular:   Pulses:       Dorsalis pedis pulses are 1+ on the right side, and 1+ on the left side  Posterior tibial pulses are Absent on the right side, and Absent on the left side  Feet:   Right Foot:   Skin Integrity: Positive for callus  Left Foot:   Skin Integrity: Positive for callus  Nursing note and vitals reviewed  Plus 2 edema bilateral feet and ankles moderate venous stasis mild hyperpigmentation  Significant hyperpigmentation lower legs bilateral  Negative calf tenderness, negative Homans sign  negative maceration, no signs of infection    All nails thick discolored dystrophic positive subungual debris positive pain on palpation  No open wounds or signs of infection  Moderate edema lower legs bilateral  Moderate hyperpigmentation lower legs well

## 2019-10-09 ENCOUNTER — OFFICE VISIT (OUTPATIENT)
Dept: PODIATRY | Facility: CLINIC | Age: 84
End: 2019-10-09
Payer: MEDICARE

## 2019-10-09 VITALS — BODY MASS INDEX: 35.82 KG/M2 | WEIGHT: 215 LBS | RESPIRATION RATE: 17 BRPM | HEIGHT: 65 IN

## 2019-10-09 DIAGNOSIS — B35.1 ONYCHOMYCOSIS: ICD-10-CM

## 2019-10-09 DIAGNOSIS — I70.209 ATHEROSCLEROTIC PERIPHERAL VASCULAR DISEASE (HCC): Primary | ICD-10-CM

## 2019-10-09 DIAGNOSIS — M79.671 PAIN IN BOTH FEET: ICD-10-CM

## 2019-10-09 DIAGNOSIS — M79.672 PAIN IN BOTH FEET: ICD-10-CM

## 2019-10-09 PROCEDURE — 11721 DEBRIDE NAIL 6 OR MORE: CPT | Performed by: PODIATRIST

## 2019-10-09 NOTE — PROGRESS NOTES
Expand All Collapse All    Assessment/Plan:     Aseptic debridement and planning of nails x10 and manually and mechanically        Follow-up 3 months      Diagnoses and all orders for this visit:     Atherosclerotic peripheral vascular disease (HCC)     Onychomycosis     Pain in both feet            Subjective:       Patient ID: Bessy Chavira is a 80 y o  female      Patient has thick painful nails that hurt when walking wearing shoes        Medical History        Past Medical History:   Diagnosis Date    Hyperlipidemia      Hypertension                 Current Outpatient Medications:     atorvastatin (LIPITOR) 10 mg tablet, , Disp: , Rfl:     KLOR-CON M20 20 MEQ tablet, , Disp: , Rfl:     Multiple Vitamin (MULTI-VITAMIN DAILY PO), Take by mouth, Disp: , Rfl:     triamterene-hydrochlorothiazide (MAXZIDE-25) 37 5-25 mg per tablet, , Disp: , Rfl:      Surgical History         Past Surgical History:   Procedure Laterality Date    JOINT REPLACEMENT         bilateral knee, right hip            No Known Allergies         Patient Active Problem List   Diagnosis    Atherosclerotic peripheral vascular disease ([address] )    Onychomycosis    Pain in both feet         Review of Systems   Constitutional: Negative  HENT: Negative  Eyes: Negative  Respiratory: Negative  Cardiovascular: Negative  Gastrointestinal: Negative  Endocrine: Negative  Genitourinary: Negative  Musculoskeletal: Positive for gait problem  Skin: Positive for color change  Allergic/Immunologic: Negative  Hematological: Negative      Psychiatric/Behavioral: Negative            Objective:  Patient's shoes and socks were removed, feet examined       /77   Pulse 83   Resp 16         Foot Exam     General  General Appearance: appears stated age and healthy   Orientation: alert and oriented to person, place, and time   Affect: appropriate         Right Foot/Ankle      Inspection and Palpation  Arch: pes planus  Hammertoes: second toe, fourth toe, fifth toe and third toe  Hallux valgus: yes  Skin Exam: callus;      Neurovascular  Dorsalis pedis: 1+  Posterior tibial: absent        Left Foot/Ankle       Inspection and Palpation  Arch: pes planus  Hammertoes: second toe, fifth toe, fourth toe and third toe  Hallux valgus: yes  Skin Exam: callus and skin changes;      Neurovascular  Dorsalis pedis: 1+  Posterior tibial: absent              Right Foot/Ankle   Right Foot Inspection  Skin Exam: callus and callus                                 Vascular     The right DP pulse is 1+  The right PT pulse is absent  Right Toe  - Comprehensive Exam  Arch: pes planus  Hammertoes: second toe, fourth toe, fifth toe and third toe  Hallux valgus: yes     Left Foot/Ankle  Left Foot Inspection  Skin Exam: callus                                             Vascular     The left DP pulse is 1+  The left PT pulse is absent  Left Toe  - Comprehensive Exam  Arch: pes planus  Hammertoes: second toe, fifth toe, fourth toe and third toe  Hallux valgus: yes          Physical Exam   Cardiovascular:   Pulses:       Dorsalis pedis pulses are 1+ on the right side, and 1+ on the left side  Posterior tibial pulses are Absent on the right side, and Absent on the left side  Feet:   Right Foot:   Skin Integrity: Positive for callus  Left Foot:   Skin Integrity: Positive for callus  Nursing note and vitals reviewed             Plus 2 edema bilateral feet and ankles moderate venous stasis mild hyperpigmentation     Significant hyperpigmentation lower legs bilateral  Negative calf tenderness, negative Homans sign  negative maceration, no signs of infection    All nails thick discolored dystrophic positive subungual debris positive pain on palpation  No open wounds or signs of infection  Moderate edema lower legs bilateral  Moderate hyperpigmentation lower legs well

## 2019-12-12 ENCOUNTER — OFFICE VISIT (OUTPATIENT)
Dept: PODIATRY | Facility: CLINIC | Age: 84
End: 2019-12-12
Payer: MEDICARE

## 2019-12-12 VITALS — HEIGHT: 65 IN | WEIGHT: 215 LBS | BODY MASS INDEX: 35.82 KG/M2

## 2019-12-12 DIAGNOSIS — M79.672 PAIN IN BOTH FEET: ICD-10-CM

## 2019-12-12 DIAGNOSIS — B35.1 ONYCHOMYCOSIS: ICD-10-CM

## 2019-12-12 DIAGNOSIS — I70.209 ATHEROSCLEROTIC PERIPHERAL VASCULAR DISEASE (HCC): ICD-10-CM

## 2019-12-12 DIAGNOSIS — M79.671 PAIN IN BOTH FEET: ICD-10-CM

## 2019-12-12 DIAGNOSIS — L85.3 XEROSIS CUTIS: Primary | ICD-10-CM

## 2019-12-12 DIAGNOSIS — M79.89 LEG SWELLING: ICD-10-CM

## 2019-12-12 DIAGNOSIS — L60.3 ONYCHODYSTROPHY: ICD-10-CM

## 2019-12-12 PROCEDURE — 11721 DEBRIDE NAIL 6 OR MORE: CPT | Performed by: PODIATRIST

## 2019-12-12 PROCEDURE — 99212 OFFICE O/P EST SF 10 MIN: CPT | Performed by: PODIATRIST

## 2019-12-12 RX ORDER — AMMONIUM LACTATE 12 G/100G
CREAM TOPICAL AS NEEDED
Qty: 385 G | Refills: 0 | Status: SHIPPED | OUTPATIENT
Start: 2019-12-12

## 2019-12-13 NOTE — PROGRESS NOTES
Expand All Collapse All    Assessment/Plan:     Aseptic debridement and planning of nails x10 and manually and mechanically  Discussed treatment options for fungal toenails, patient opted to use topical measures, dispense Cn-u OTC topical antifungal medication, nails was aseptically debrided using Nipper and a sterile ame, a local applied patient tolerated procedure well  Ammonium lactate prescribed for daily application        Follow-up 3 months      Diagnoses and all orders for this visit:     Atherosclerotic peripheral vascular disease ([place] [address] )     Onychomycosis/ onychodystrophy     Pain in both feet  Edema  Patient educated on lower extremity leg elevations, patient educated and the use of compression stockings, patient follow-up with her primary for the management    Xerosis cutis               Subjective:       Patient ID: Lindsey Castro is a 80 y o  female      Patient has thick painful nails that hurt when walking wearing shoes, failed self treatment due to arthritis        Medical History        Past Medical History:   Diagnosis Date    Hyperlipidemia      Hypertension                 Current Outpatient Medications:     atorvastatin (LIPITOR) 10 mg tablet, , Disp: , Rfl:     KLOR-CON M20 20 MEQ tablet, , Disp: , Rfl:     Multiple Vitamin (MULTI-VITAMIN DAILY PO), Take by mouth, Disp: , Rfl:     triamterene-hydrochlorothiazide (MAXZIDE-25) 37 5-25 mg per tablet, , Disp: , Rfl:      Surgical History         Past Surgical History:   Procedure Laterality Date    JOINT REPLACEMENT         bilateral knee, right hip            No Known Allergies         Patient Active Problem List   Diagnosis    Atherosclerotic peripheral vascular disease ([facility] )    Onychomycosis    Pain in both feet         Review of Systems   Constitutional: Negative  HENT: Negative  Eyes: Negative  Respiratory: Negative  Cardiovascular: Negative  Gastrointestinal: Negative  Endocrine: Negative      Genitourinary: Negative  Musculoskeletal: Positive for gait problem  Skin: Positive for color change  Allergic/Immunologic: Negative  Hematological: Negative  Psychiatric/Behavioral: Negative            Objective:  Patient's shoes and socks were removed, feet examined       /77   Pulse 83   Resp 16         Foot Exam     General  General Appearance: appears stated age and healthy   Orientation: alert and oriented to person, place, and time   Affect: appropriate         Right Foot/Ankle      Inspection and Palpation  Arch: pes planus  Hammertoes: second toe, fourth toe, fifth toe and third toe  Hallux valgus: yes  Skin Exam: callus;      Neurovascular  Dorsalis pedis: 1+  Posterior tibial: absent        Left Foot/Ankle       Inspection and Palpation  Arch: pes planus  Hammertoes: second toe, fifth toe, fourth toe and third toe  Hallux valgus: yes  Skin Exam: callus and skin changes;      Neurovascular  Dorsalis pedis: 1+  Posterior tibial: absent              Right Foot/Ankle   Right Foot Inspection  Skin Exam: callus and callus                                 Vascular     The right DP pulse is 1+  The right PT pulse is absent  Right Toe  - Comprehensive Exam  Arch: pes planus  Hammertoes: second toe, fourth toe, fifth toe and third toe  Hallux valgus: yes     Left Foot/Ankle  Left Foot Inspection  Skin Exam: callus                                             Vascular     The left DP pulse is 1+  The left PT pulse is absent  Left Toe  - Comprehensive Exam  Arch: pes planus  Hammertoes: second toe, fifth toe, fourth toe and third toe  Hallux valgus: yes          Physical Exam   Cardiovascular:   Pulses:       Dorsalis pedis pulses are 1+ on the right side, and 1+ on the left side  Posterior tibial pulses are Absent on the right side, and Absent on the left side  Feet:   Right Foot:   Skin Integrity: Positive for callus  Left Foot:   Skin Integrity: Positive for callus     Nursing note and vitals reviewed             Plus 2 edema bilateral feet and ankles moderate venous stasis mild hyperpigmentation     Significant hyperpigmentation lower legs bilateral  Negative calf tenderness, negative Homans sign  negative maceration, no signs of infection    All nails thick discolored dystrophic positive subungual debris positive pain on palpation  No open wounds or signs of infection  Diffuse dry ashy skin eruption dorsal foot and ankle extended to the tibial tuberosity with flaking, fissures and crevices noted posterior heel

## 2020-02-26 ENCOUNTER — OFFICE VISIT (OUTPATIENT)
Dept: PODIATRY | Facility: CLINIC | Age: 85
End: 2020-02-26
Payer: MEDICARE

## 2020-02-26 VITALS — SYSTOLIC BLOOD PRESSURE: 118 MMHG | DIASTOLIC BLOOD PRESSURE: 74 MMHG | RESPIRATION RATE: 16 BRPM | HEART RATE: 72 BPM

## 2020-02-26 DIAGNOSIS — M79.671 PAIN IN BOTH FEET: ICD-10-CM

## 2020-02-26 DIAGNOSIS — I70.209 ATHEROSCLEROTIC PERIPHERAL VASCULAR DISEASE (HCC): Primary | ICD-10-CM

## 2020-02-26 DIAGNOSIS — B35.1 ONYCHOMYCOSIS: ICD-10-CM

## 2020-02-26 DIAGNOSIS — M79.672 PAIN IN BOTH FEET: ICD-10-CM

## 2020-02-26 PROCEDURE — 11721 DEBRIDE NAIL 6 OR MORE: CPT | Performed by: PODIATRIST

## 2020-02-26 NOTE — PROGRESS NOTES
Assessment/Plan:     Aseptic debridement and planning of nails x10 and manually and mechanically        Follow-up 3 months      Diagnoses and all orders for this visit:     Atherosclerotic peripheral vascular disease (HCC)     Onychomycosis     Pain in both feet           Subjective:       Patient ID: Cha Franco is a 80 y  o  female      Patient has thick painful nails that hurt when walking wearing shoes        Medical History           Past Medical History:   Diagnosis Date    Hyperlipidemia      Hypertension                 Current Outpatient Medications:     atorvastatin (LIPITOR) 10 mg tablet, , Disp: , Rfl:     KLOR-CON M20 20 MEQ tablet, , Disp: , Rfl:     Multiple Vitamin (MULTI-VITAMIN DAILY PO), Take by mouth, Disp: , Rfl:     triamterene-hydrochlorothiazide (MAXZIDE-25) 37 5-25 mg per tablet, , Disp: , Rfl:      Surgical History             Past Surgical History:   Procedure Laterality Date    JOINT REPLACEMENT         bilateral knee, right hip            No Known Allergies           Patient Active Problem List   Diagnosis    Atherosclerotic peripheral vascular disease (HCC)    Onychomycosis    Pain in both feet         Review of Systems   Constitutional: Negative     HENT: Negative     Eyes: Negative     Respiratory: Negative     Cardiovascular: Negative     Gastrointestinal: Negative     Endocrine: Negative     Genitourinary: Negative     Musculoskeletal: Positive for gait problem  Skin: Positive for color change     Allergic/Immunologic: Negative     Hematological: Negative     Psychiatric/Behavioral: Negative           Objective:  Patient's shoes and socks were removed, feet examined       /77   Pulse 83   Resp 16         Foot Exam     General  General Appearance: appears stated age and healthy   Orientation: alert and oriented to person, place, and time   Affect: appropriate         Right Foot/Ankle      Inspection and Palpation  Arch: pes planus  Hammertoes: second toe, fourth toe, fifth toe and third toe  Hallux valgus: yes  Skin Exam: callus;      Neurovascular  Dorsalis pedis: 1+  Posterior tibial: absent        Left Foot/Ankle       Inspection and Palpation  Arch: pes planus  Hammertoes: second toe, fifth toe, fourth toe and third toe  Hallux valgus: yes  Skin Exam: callus and skin changes;      Neurovascular  Dorsalis pedis: 1+  Posterior tibial: absent              Right Foot/Ankle   Right Foot Inspection  Skin Exam: callus and callus                     Vascular     The right DP pulse is 1+  The right PT pulse is absent  Right Toe  - Comprehensive Exam  Arch: pes planus  Hammertoes: second toe, fourth toe, fifth toe and third toe  Hallux valgus: yes     Left Foot/Ankle  Left Foot Inspection  Skin Exam: callus                    Vascular     The left DP pulse is 1+  The left PT pulse is absent  Left Toe  - Comprehensive Exam  Arch: pes planus  Hammertoes: second toe, fifth toe, fourth toe and third toe  Hallux valgus: yes          Physical Exam   Cardiovascular:   Pulses:       Dorsalis pedis pulses are 1+ on the right side, and 1+ on the left side         Posterior tibial pulses are Absent on the right side, and Absent on the left side  Feet:   Right Foot:   Skin Integrity: Positive for callus  Left Foot:   Skin Integrity: Positive for callus    Nursing note and vitals reviewed            Plus 2 edema bilateral feet and ankles moderate venous stasis mild hyperpigmentation     Significant hyperpigmentation lower legs bilateral  Negative calf tenderness, negative Homans sign  negative maceration, no signs of infection    All nails thick discolored dystrophic positive subungual debris positive pain on palpation  No open wounds or signs of infection  Moderate edema lower legs bilateral  Moderate hyperpigmentation lower legs well

## 2020-06-24 ENCOUNTER — OFFICE VISIT (OUTPATIENT)
Dept: PODIATRY | Facility: CLINIC | Age: 85
End: 2020-06-24
Payer: MEDICARE

## 2020-06-24 DIAGNOSIS — B35.1 ONYCHOMYCOSIS: ICD-10-CM

## 2020-06-24 DIAGNOSIS — I70.209 ATHEROSCLEROTIC PERIPHERAL VASCULAR DISEASE (HCC): Primary | ICD-10-CM

## 2020-06-24 DIAGNOSIS — M79.672 PAIN IN BOTH FEET: ICD-10-CM

## 2020-06-24 DIAGNOSIS — M79.671 PAIN IN BOTH FEET: ICD-10-CM

## 2020-06-24 PROCEDURE — 11721 DEBRIDE NAIL 6 OR MORE: CPT | Performed by: PODIATRIST

## 2020-08-02 ENCOUNTER — HOSPITAL ENCOUNTER (EMERGENCY)
Facility: HOSPITAL | Age: 85
Discharge: HOME | End: 2020-08-02
Attending: EMERGENCY MEDICINE
Payer: MEDICARE

## 2020-08-02 ENCOUNTER — APPOINTMENT (EMERGENCY)
Dept: RADIOLOGY | Facility: HOSPITAL | Age: 85
End: 2020-08-02
Payer: MEDICARE

## 2020-08-02 VITALS
BODY MASS INDEX: 34.61 KG/M2 | WEIGHT: 208 LBS | TEMPERATURE: 97.8 F | OXYGEN SATURATION: 95 % | SYSTOLIC BLOOD PRESSURE: 153 MMHG | HEART RATE: 95 BPM | DIASTOLIC BLOOD PRESSURE: 75 MMHG | RESPIRATION RATE: 18 BRPM

## 2020-08-02 DIAGNOSIS — S02.2XXA CLOSED FRACTURE OF NASAL BONE, INITIAL ENCOUNTER: Primary | ICD-10-CM

## 2020-08-02 DIAGNOSIS — S09.90XA INJURY OF HEAD, INITIAL ENCOUNTER: ICD-10-CM

## 2020-08-02 PROCEDURE — 99282 EMERGENCY DEPT VISIT SF MDM: CPT | Performed by: EMERGENCY MEDICINE

## 2020-08-02 PROCEDURE — 72125 CT NECK SPINE W/O DYE: CPT

## 2020-08-02 PROCEDURE — G1004 CDSM NDSC: HCPCS

## 2020-08-02 PROCEDURE — 90471 IMMUNIZATION ADMIN: CPT

## 2020-08-02 PROCEDURE — 99284 EMERGENCY DEPT VISIT MOD MDM: CPT

## 2020-08-02 PROCEDURE — 70450 CT HEAD/BRAIN W/O DYE: CPT

## 2020-08-02 PROCEDURE — 90715 TDAP VACCINE 7 YRS/> IM: CPT | Performed by: EMERGENCY MEDICINE

## 2020-08-02 PROCEDURE — 70486 CT MAXILLOFACIAL W/O DYE: CPT

## 2020-08-02 RX ADMIN — TETANUS TOXOID, REDUCED DIPHTHERIA TOXOID AND ACELLULAR PERTUSSIS VACCINE, ADSORBED 0.5 ML: 5; 2.5; 8; 8; 2.5 SUSPENSION INTRAMUSCULAR at 13:24

## 2020-08-02 NOTE — ED PROVIDER NOTES
History  Chief Complaint   Patient presents with    Fall     Pt reports she tripped over her doorway and hit her head off of the floor  Denies LOC  Denies BT  Swelling and bruising nopted above patient's L eye  Small laceration noted to bridge of her nose bleeding controlled  Denies any other complaints  25-year-old female reports that she was walking through her doorway and she tripped over a portion of the still on the floor  States she landed on her hands and then her head struck the floor  Does have a small superficial nonsuturable laceration to the bridge of her nose from her glasses  Patient also has a large hematoma above the left eye in the supraorbital ridge  No loss of consciousness no neck pain no other noticeable injuries or complaints  History provided by:  Patient and friend   used: No    Fall   Associated symptoms: no abdominal pain, no back pain, no chest pain, no headaches, no nausea, no neck pain and no vomiting        Prior to Admission Medications   Prescriptions Last Dose Informant Patient Reported? Taking? KLOR-CON M20 20 MEQ tablet   Yes No   Multiple Vitamin (MULTI-VITAMIN DAILY PO)   Yes No   Sig: Take by mouth   PHYTOSTEROL-FISH OIL-EPA-DHA PO   Yes No   Sig: Take by mouth   ammonium lactate (LAC-HYDRIN) 12 % cream   No No   Sig: Apply topically as needed for dry skin   atorvastatin (LIPITOR) 10 mg tablet   Yes No   triamterene-hydrochlorothiazide (MAXZIDE-25) 37 5-25 mg per tablet   Yes No      Facility-Administered Medications: None       Past Medical History:   Diagnosis Date    Hyperlipidemia     Hypertension        Past Surgical History:   Procedure Laterality Date    JOINT REPLACEMENT      bilateral knee, right hip       History reviewed  No pertinent family history  I have reviewed and agree with the history as documented      E-Cigarette/Vaping    E-Cigarette Use Never User      E-Cigarette/Vaping Substances    Nicotine No     THC No     CBD No     Flavoring No     Other No     Unknown No      Social History     Tobacco Use    Smoking status: Never Smoker    Smokeless tobacco: Never Used   Substance Use Topics    Alcohol use: Not Currently    Drug use: Never       Review of Systems   Constitutional: Negative for activity change, chills, diaphoresis and fever  HENT: Negative for congestion, ear pain, nosebleeds, sore throat, trouble swallowing and voice change  Eyes: Negative for pain, discharge and redness  Respiratory: Negative for apnea, cough, choking, shortness of breath, wheezing and stridor  Cardiovascular: Negative for chest pain and palpitations  Gastrointestinal: Negative for abdominal distention, abdominal pain, constipation, diarrhea, nausea and vomiting  Endocrine: Negative for polydipsia  Genitourinary: Negative for difficulty urinating, dysuria, flank pain, frequency, hematuria and urgency  Musculoskeletal: Negative for back pain, gait problem, joint swelling, myalgias, neck pain and neck stiffness  Skin: Negative for pallor and rash  Neurological: Negative for dizziness, tremors, syncope, speech difficulty, weakness, numbness and headaches  Hematological: Negative for adenopathy  Psychiatric/Behavioral: Negative for confusion, hallucinations, self-injury and suicidal ideas  The patient is not nervous/anxious  Physical Exam  Physical Exam  Vitals signs and nursing note reviewed  Constitutional:       General: She is not in acute distress  Appearance: She is well-developed  She is not diaphoretic  HENT:      Head: Normocephalic and atraumatic  Comments: Patient has large hematoma supraorbital ridge above the left orbit  Also superficial laceration to the bridge of the nose non suturable    No other complaints     Right Ear: External ear normal       Left Ear: External ear normal       Nose: Nose normal    Eyes:      Conjunctiva/sclera: Conjunctivae normal       Pupils: Pupils are equal, round, and reactive to light  Neck:      Musculoskeletal: Normal range of motion and neck supple  Cardiovascular:      Rate and Rhythm: Normal rate and regular rhythm  Heart sounds: Normal heart sounds  Pulmonary:      Effort: Pulmonary effort is normal       Breath sounds: Normal breath sounds  Abdominal:      General: Bowel sounds are normal       Palpations: Abdomen is soft  Musculoskeletal: Normal range of motion  Skin:     General: Skin is warm and dry  Neurological:      Mental Status: She is alert and oriented to person, place, and time  Deep Tendon Reflexes: Reflexes are normal and symmetric  Vital Signs  ED Triage Vitals [08/02/20 1301]   Temperature Pulse Respirations Blood Pressure SpO2   97 8 °F (36 6 °C) 95 18 153/75 95 %      Temp Source Heart Rate Source Patient Position - Orthostatic VS BP Location FiO2 (%)   Oral Monitor Lying Right arm --      Pain Score       2           Vitals:    08/02/20 1301   BP: 153/75   Pulse: 95   Patient Position - Orthostatic VS: Lying         Visual Acuity  Visual Acuity      Most Recent Value   L Pupil Size (mm)  3   R Pupil Size (mm)  3          ED Medications  Medications   tetanus-diphtheria-acellular pertussis (BOOSTRIX) IM injection 0 5 mL (0 5 mL Intramuscular Given 8/2/20 1324)       Diagnostic Studies  Results Reviewed     None                 CT head without contrast   Final Result by Sam Washington MD (08/02 1347)      No acute intracranial abnormality  Workstation performed: [id number]         CT facial bones without contrast   Final Result by Sam Washington MD (08/02 1353)   Minimally displaced right nasal bone fracture without further facial bone fracture appreciated  Workstation performed: [id number]         CT cervical spine without contrast   Final Result by Sam Washington MD (08/02 1406)      No cervical spine fracture or traumatic malalignment                     Workstation performed: [id number]                    Procedures  Procedures         ED Course       US AUDIT      Most Recent Value   Initial Alcohol Screen: US AUDIT-C    1  How often do you have a drink containing alcohol? 1 Filed at: 08/02/2020 1303   2  How many drinks containing alcohol do you have on a typical day you are drinking? 0 Filed at: 08/02/2020 1303   3a  Male UNDER 65: How often do you have five or more drinks on one occasion? 0 Filed at: 08/02/2020 1303   3b  FEMALE Any Age, or MALE 65+: How often do you have 4 or more drinks on one occassion? 0 Filed at: 08/02/2020 1303   Audit-C Score  1 Filed at: 08/02/2020 1303                  JUNE/DAST-10      Most Recent Value   How many times in the past year have you    Used an illegal drug or used a prescription medication for non-medical reasons? Never Filed at: 08/02/2020 1303                                MDM      Disposition  Final diagnoses:   Closed fracture of nasal bone, initial encounter   Injury of head, initial encounter     Time reflects when diagnosis was documented in both MDM as applicable and the Disposition within this note     Time User Action Codes Description Comment    8/2/2020  2:09 PM Lito Santana Add [S02  2XXA] Closed fracture of nasal bone, initial encounter     8/2/2020  2:09 PM Lito Santana Add [S09 90XA] Injury of head, initial encounter       ED Disposition     ED Disposition Condition Date/Time Comment    Discharge Stable Sun Aug 2, 2020  2:09 PM Betty Ordonez discharge to home/self care              Follow-up Information     Follow up With Specialties Details Why Contact Info    Yoseph Del Angel DO Family Medicine Schedule an appointment as soon as possible for a visit  As needed [facility] [address] [facility]      Nery Greene MD Otolaryngology Schedule an appointment as soon as possible for a visit  As needed [address]  [phone]            Patient's Medications   Discharge Prescriptions    No medications on file     No discharge procedures on file      PDMP Review     None          ED Provider  Electronically Signed by           Sarah Curry DO  08/02/20 5992

## 2020-08-26 ENCOUNTER — OFFICE VISIT (OUTPATIENT)
Dept: PODIATRY | Facility: CLINIC | Age: 85
End: 2020-08-26
Payer: MEDICARE

## 2020-08-26 VITALS
HEIGHT: 65 IN | BODY MASS INDEX: 34.66 KG/M2 | WEIGHT: 208 LBS | SYSTOLIC BLOOD PRESSURE: 153 MMHG | DIASTOLIC BLOOD PRESSURE: 75 MMHG | RESPIRATION RATE: 17 BRPM | HEART RATE: 95 BPM

## 2020-08-26 DIAGNOSIS — I70.209 ATHEROSCLEROTIC PERIPHERAL VASCULAR DISEASE (HCC): Primary | ICD-10-CM

## 2020-08-26 DIAGNOSIS — M79.671 PAIN IN BOTH FEET: ICD-10-CM

## 2020-08-26 DIAGNOSIS — B35.1 ONYCHOMYCOSIS: ICD-10-CM

## 2020-08-26 DIAGNOSIS — M79.672 PAIN IN BOTH FEET: ICD-10-CM

## 2020-08-26 PROCEDURE — 11721 DEBRIDE NAIL 6 OR MORE: CPT | Performed by: PODIATRIST

## 2020-08-26 NOTE — PROGRESS NOTES
Assessment/Plan:     Aseptic debridement and planning of nails x10 and manually and mechanically        Follow-up 3 months       Diagnoses and all orders for this visit:     Atherosclerotic peripheral vascular disease (HCC)     Onychomycosis     Pain in both feet     Chronic edema  Rule out deep venous insufficiency period skin lesion right lower extremity           Subjective:       Patient ID: Cha Franco is a 80 y  o  female      Patient has thick painful nails that hurt when walking wearing shoes        Medical History           Past Medical History:   Diagnosis Date    Hyperlipidemia      Hypertension                 Current Outpatient Medications:     atorvastatin (LIPITOR) 10 mg tablet, , Disp: , Rfl:     KLOR-CON M20 20 MEQ tablet, , Disp: , Rfl:     Multiple Vitamin (MULTI-VITAMIN DAILY PO), Take by mouth, Disp: , Rfl:     triamterene-hydrochlorothiazide (MAXZIDE-25) 37 5-25 mg per tablet, , Disp: , Rfl:      Surgical History             Past Surgical History:   Procedure Laterality Date    JOINT REPLACEMENT         bilateral knee, right hip            No Known Allergies           Patient Active Problem List   Diagnosis    Atherosclerotic peripheral vascular disease (HCC)    Onychomycosis    Pain in both feet         Review of Systems   Constitutional: Negative     HENT: Negative     Eyes: Negative     Respiratory: Negative     Cardiovascular: Negative     Gastrointestinal: Negative     Endocrine: Negative     Genitourinary: Negative     Musculoskeletal: Positive for gait problem  Skin: Positive for color change     Allergic/Immunologic: Negative     Hematological: Negative     Psychiatric/Behavioral: Negative           Objective:  Patient's shoes and socks were removed, feet examined       /77   Pulse 83   Resp 16         Foot Exam     General  General Appearance: appears stated age and healthy   Orientation: alert and oriented to person, place, and time   Affect: appropriate         Right Foot/Ankle      Inspection and Palpation  Arch: pes planus  Hammertoes: second toe, fourth toe, fifth toe and third toe  Hallux valgus: yes  Skin Exam: callus;      Neurovascular  Dorsalis pedis: 1+  Posterior tibial: absent        Left Foot/Ankle       Inspection and Palpation  Arch: pes planus  Hammertoes: second toe, fifth toe, fourth toe and third toe  Hallux valgus: yes  Skin Exam: callus and skin changes;      Neurovascular  Dorsalis pedis: 1+  Posterior tibial: absent              Right Foot/Ankle   Right Foot Inspection  Skin Exam: callus and callus                     Vascular     The right DP pulse is 1+  The right PT pulse is absent  Right Toe  - Comprehensive Exam  Arch: pes planus  Hammertoes: second toe, fourth toe, fifth toe and third toe  Hallux valgus: yes     Left Foot/Ankle  Left Foot Inspection  Skin Exam: callus                    Vascular     The left DP pulse is 1+  The left PT pulse is absent  Left Toe  - Comprehensive Exam  Arch: pes planus  Hammertoes: second toe, fifth toe, fourth toe and third toe  Hallux valgus: yes          Physical Exam   Cardiovascular:   Pulses:       Dorsalis pedis pulses are 1+ on the right side, and 1+ on the left side         Posterior tibial pulses are Absent on the right side, and Absent on the left side  Feet:   Right Foot:   Skin Integrity: Positive for callus  Left Foot:   Skin Integrity: Positive for callus    Nursing note and vitals reviewed            Plus 2 edema bilateral feet and ankles moderate venous stasis mild hyperpigmentation     Significant hyperpigmentation lower legs bilateral  Negative calf tenderness, negative Homans sign  negative maceration, no signs of infection    All nails thick discolored dystrophic positive subungual debris positive pain on palpation  No open wounds or signs of infection  Moderate edema lower legs bilateral  Moderate hyperpigmentation lower legs well

## 2021-02-03 ENCOUNTER — IMMUNIZATIONS (OUTPATIENT)
Dept: FAMILY MEDICINE CLINIC | Facility: HOSPITAL | Age: 86
End: 2021-02-03

## 2021-02-03 DIAGNOSIS — Z23 ENCOUNTER FOR IMMUNIZATION: Primary | ICD-10-CM

## 2021-02-03 PROCEDURE — 91301 SARS-COV-2 / COVID-19 MRNA VACCINE (MODERNA) 100 MCG: CPT

## 2021-02-03 PROCEDURE — 0011A SARS-COV-2 / COVID-19 MRNA VACCINE (MODERNA) 100 MCG: CPT

## 2021-03-03 ENCOUNTER — IMMUNIZATIONS (OUTPATIENT)
Dept: FAMILY MEDICINE CLINIC | Facility: HOSPITAL | Age: 86
End: 2021-03-03

## 2021-03-03 DIAGNOSIS — Z23 ENCOUNTER FOR IMMUNIZATION: Primary | ICD-10-CM

## 2021-03-03 PROCEDURE — 0012A SARS-COV-2 / COVID-19 MRNA VACCINE (MODERNA) 100 MCG: CPT

## 2021-03-03 PROCEDURE — 91301 SARS-COV-2 / COVID-19 MRNA VACCINE (MODERNA) 100 MCG: CPT

## 2021-10-29 ENCOUNTER — IMMUNIZATIONS (OUTPATIENT)
Dept: FAMILY MEDICINE CLINIC | Facility: HOSPITAL | Age: 86
End: 2021-10-29

## 2021-10-29 DIAGNOSIS — Z23 ENCOUNTER FOR IMMUNIZATION: Primary | ICD-10-CM
